# Patient Record
Sex: MALE | Race: OTHER
[De-identification: names, ages, dates, MRNs, and addresses within clinical notes are randomized per-mention and may not be internally consistent; named-entity substitution may affect disease eponyms.]

---

## 2019-12-09 ENCOUNTER — HOSPITAL ENCOUNTER (EMERGENCY)
Dept: HOSPITAL 62 - ER | Age: 44
LOS: 1 days | Discharge: HOME | End: 2019-12-10
Payer: SELF-PAY
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
## 2019-12-09 DIAGNOSIS — S66.822A: Primary | ICD-10-CM

## 2019-12-09 DIAGNOSIS — S61.412A: ICD-10-CM

## 2019-12-09 PROCEDURE — 96372 THER/PROPH/DIAG INJ SC/IM: CPT

## 2019-12-09 PROCEDURE — 13132 CMPLX RPR F/C/C/M/N/AX/G/H/F: CPT

## 2019-12-09 PROCEDURE — 99283 EMERGENCY DEPT VISIT LOW MDM: CPT

## 2019-12-09 PROCEDURE — 73130 X-RAY EXAM OF HAND: CPT

## 2019-12-09 NOTE — RADIOLOGY REPORT (SQ)
EXAM DESCRIPTION: 



XR HAND 3 OR MORE VIEWS



COMPLETED DATE/TME:  12/09/2019 00:00



CLINICAL HISTORY: 



44 years, Male, LACERATION 



COMPARISON:

None.



NUMBER OF VIEWS:

4



TECHNIQUE:

4 view left hand



LIMITATIONS:

None.



FINDINGS:



Overlying bandaging material is present causing significant

artifact of the thumb. As visualized no acute fracture. No

definitive dislocation. Assessment for foreign body is

nondiagnostic due to artifact



IMPRESSION:



Limited due to overlying artifact/bandaging material. No

convincing evidence for acute osseous abnormality.

 



copyright 2011 [employer]- All Rights Reserved

## 2019-12-10 VITALS — SYSTOLIC BLOOD PRESSURE: 136 MMHG | DIASTOLIC BLOOD PRESSURE: 77 MMHG
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
